# Patient Record
Sex: MALE | Race: WHITE | Employment: OTHER | URBAN - METROPOLITAN AREA
[De-identification: names, ages, dates, MRNs, and addresses within clinical notes are randomized per-mention and may not be internally consistent; named-entity substitution may affect disease eponyms.]

---

## 2017-07-15 PROBLEM — R60.0 BILATERAL LEG EDEMA: Status: ACTIVE | Noted: 2017-07-15

## 2017-07-15 PROBLEM — E78.5 HYPERLIPIDEMIA: Status: ACTIVE | Noted: 2017-07-15

## 2017-07-15 PROBLEM — I25.119 CORONARY ARTERY DISEASE WITH ANGINA PECTORIS (HCC): Status: ACTIVE | Noted: 2017-07-15

## 2017-07-15 PROBLEM — I27.20 PULMONARY HTN (HCC): Status: ACTIVE | Noted: 2017-07-15

## 2018-03-06 PROBLEM — I25.10 CORONARY ARTERY DISEASE INVOLVING NATIVE CORONARY ARTERY OF NATIVE HEART WITHOUT ANGINA PECTORIS: Status: ACTIVE | Noted: 2018-03-06

## 2019-10-24 PROBLEM — I82.409 DEEP VEIN THROMBOSIS (HCC): Status: ACTIVE | Noted: 2017-01-12

## 2019-10-24 PROBLEM — I20.0 PROGRESSIVE ANGINA (HCC): Status: ACTIVE | Noted: 2017-01-12

## 2019-10-24 PROBLEM — M10.9 GOUT: Status: ACTIVE | Noted: 2017-01-12

## 2021-11-03 ENCOUNTER — APPOINTMENT (OUTPATIENT)
Dept: GENERAL RADIOLOGY | Facility: CLINIC | Age: 66
End: 2021-11-03
Payer: MEDICARE

## 2021-11-03 ENCOUNTER — HOSPITAL ENCOUNTER (EMERGENCY)
Facility: CLINIC | Age: 66
Discharge: HOME | End: 2021-11-03
Attending: EMERGENCY MEDICINE
Payer: MEDICARE

## 2021-11-03 VITALS
RESPIRATION RATE: 22 BRPM | TEMPERATURE: 98.4 F | HEIGHT: 74 IN | DIASTOLIC BLOOD PRESSURE: 85 MMHG | BODY MASS INDEX: 40.43 KG/M2 | WEIGHT: 315 LBS | SYSTOLIC BLOOD PRESSURE: 121 MMHG | OXYGEN SATURATION: 98 % | HEART RATE: 115 BPM

## 2021-11-03 DIAGNOSIS — R05.9 COUGH: Primary | ICD-10-CM

## 2021-11-03 PROCEDURE — 99283 EMERGENCY DEPT VISIT LOW MDM: CPT

## 2021-11-03 PROCEDURE — 71045 X-RAY EXAM CHEST 1 VIEW: CPT

## 2021-11-03 RX ORDER — PREDNISONE 10 MG/1
TABLET ORAL
Qty: 20 TABLET | Refills: 0 | Status: SHIPPED | OUTPATIENT
Start: 2021-11-03 | End: 2021-11-13

## 2021-11-03 RX ORDER — AZITHROMYCIN 250 MG/1
TABLET, FILM COATED ORAL
Qty: 1 PACKET | Refills: 0 | Status: SHIPPED | OUTPATIENT
Start: 2021-11-03 | End: 2021-11-07

## 2021-11-03 NOTE — ED PROVIDER NOTES
Weight: (!) 152.4 kg (336 lb)   Height: 6' 2\" (1.88 m)     -------------------------  BP: 121/85, Temp: 98.4 °F (36.9 °C), Pulse: 115, Resp: 22          FINAL IMPRESSION      1. Cough          DISPOSITION/PLAN   DISPOSITION Decision To Discharge 11/03/2021 07:52:27 PM    I have reviewed the disposition diagnosis with the patient and or their family/guardian. I have answered their questions and given discharge instructions. They voiced understanding of these instructions and did not have any further questions or complaints. Condition on Disposition    fair    PATIENT REFERRED TO:  Warner Solomon   [address] [address] [facility]  [phone]    In 2 days        DISCHARGE MEDICATIONS:  New Prescriptions    AZITHROMYCIN (ZITHROMAX Z-GERMAN) 250 MG TABLET    Take 2 tablets (500 mg) on Day 1, and then take 1 tablet (250 mg) on days 2 through 5.     PREDNISONE (DELTASONE) 10 MG TABLET    Take 4 tablets by mouth once daily for 5 days       (Please note that portions of this note were completed with a voice recognition program.  Efforts were made to edit the dictations but occasionally words are mis-transcribed.)    Tank Kelly MD,, MD  Attending Emergency Physician        Tank Kelly MD  11/03/21 1956

## 2021-11-03 NOTE — ED PROVIDER NOTES
[address] [facility]      Pt Name: Royce Charlton  MRN: [id number]  [place] 1955  Date of evaluation: 11/3/2021      CHIEF COMPLAINT       Chief Complaint   Patient presents with    Cough         HISTORY OF PRESENT ILLNESS     The patient presents with cough for 4 days. He was exposed to family members with cough and cold symptoms. The patient says he has had a cough with clear sputum. He has not had a fever. He has been vaccinated against COVID-19. He denies chest pain. He has not noted any worsening leg swelling than normal.  Nothing makes his symptoms better or worse otherwise. REVIEW OF SYSTEMS       All systems reviewed and negative unless noted in HPI. The patient denies fever or constitutional symptoms. Denies vision change. Mild rhinorrhea. Minimal sore throat. Denies any neck pain or stiffness. Denies chest pain. Productive cough of clear sputum with mild shortness of breath. Denies hemoptysis. No nausea,  vomiting or diarrhea. Denies any dysuria. Denies urinary frequency or hematuria. Denies musculoskeletal injury or pain. Denies any weakness, numbness or focal neurologic deficit. Chronic edema in legs. No recent psychiatric issues. Takes warfarin. Denies any polyuria, polydypsia or history of immunocompromise. PAST MEDICAL HISTORY    has a past medical history of Arthritis, CAD (coronary artery disease), Chronic kidney disease, Gastric ulcer, Gout, History of pulmonary embolus (PE), Hypercoagulable state ([address] Hyperlipidemia, Hypertension, Proteinuria, and Pulmonary emboli ([address] SURGICAL HISTORY      has a past surgical history that includes Vasectomy; Coronary angioplasty with stent (5/14); Vena Cava Filter Placement; vasectomy reversal; and Tonsillectomy.     CURRENT MEDICATIONS       Previous Medications    ASCORBIC ACID (VITAMIN C) 500 MG TABLET    Take 1,000 mg by mouth daily     ASPIRIN 81 MG TABLET    Take 81 mg by mouth daily    ATORVASTATIN (LIPITOR) 40 MG TABLET    TAKE ONE TABLET BY MOUTH DAILY    ATORVASTATIN (LIPITOR) 40 MG TABLET    Take 1 tablet by mouth daily    CALCIUM CARBONATE (OSCAL) 500 MG TABS TABLET    Take 1,000 mg by mouth daily    CANDESARTAN (ATACAND) 4 MG TABLET    TAKE ONE TABLET BY MOUTH DAILY    FUROSEMIDE (LASIX) 20 MG TABLET    Take 1 tablet by mouth daily    MULTIPLE VITAMINS-MINERALS (MULTIVITAMIN WITH MINERALS) TABLET    Take 1 tablet by mouth daily. VITAMIN B-12 (CYANOCOBALAMIN) 1000 MCG TABLET    Take 1,000 mcg by mouth daily. VITAMIN D (CHOLECALCIFEROL) 1000 UNIT TABS TABLET    Take 1,000 Units by mouth daily     WARFARIN (COUMADIN) 1 MG TABLET    Take by mouth Mon,wed,fri 5mg/tues,th,sat,sun-7mg    ZOLPIDEM (AMBIEN) 10 MG TABLET    Take 10 mg by mouth nightly as needed for Sleep. ALLERGIES     has No Known Allergies. FAMILY HISTORY     He indicated that his mother is . He indicated that his father is . family history includes Heart Disease in his mother. SOCIAL HISTORY      reports that he quit smoking about 8 years ago. He has never used smokeless tobacco. He reports current alcohol use. He reports that he does not use drugs. PHYSICAL EXAM     INITIAL VITALS:  height is 6' 2\" (1.88 m) and weight is 152.4 kg (336 lb) (abnormal). His temperature is 98.4 °F (36.9 °C). His blood pressure is 121/85 and his pulse is 115. His respiration is 22 and oxygen saturation is 98%. The patient is alert and oriented, in no apparent distress. HEENT is atraumatic. Pupils are PERRL at 4 mm with normal extraocular motion. Mucous membranes moist.    Neck is supple. Heart sounds regular rate and rhythm with no gallops, murmurs, or rubs. Lungs clear, no wheezes, rales or rhonchi. Abdomen: soft, nontender with no pain to palpation. Musculoskeletal exam shows no evidence of trauma. Normal distal pulses in all extremities.   Skin: Trace to 1+ edema with chronic stasis change in the legs bilaterally. Neurological exam reveals cranial nerves 2 through 12 grossly intact. Patient has equal  and normal deep tendon reflexes. Psychiatric: Appropriate  Lymphatics.:  No lymphadenopathy. DIFFERENTIAL DIAGNOSIS/ MDM:     URI, cough, bronchitis, pneumonia, ACS, CHF    DIAGNOSTIC RESULTS     RADIOLOGY:   I reviewed the radiologist interpretations:  XR CHEST PORTABLE    (Results Pending)           EMERGENCY DEPARTMENT COURSE:   Vitals:    Vitals:    11/03/21 1838   BP: 121/85   Pulse: 115   Resp: 22   Temp: 98.4 °F (36.9 °C)   SpO2: 98%   Weight: (!) 152.4 kg (336 lb)   Height: 6' 2\" (1.88 m)     -------------------------  BP: 121/85, Temp: 98.4 °F (36.9 °C), Pulse: 115, Resp: 22      Re-evaluation Notes    The patient will be endorsed to Dr. Dottie Tai secondary to end of shift. Please refer to his documentation. FINAL IMPRESSION    No diagnosis found. DISPOSITION/PLAN   DISPOSITION        Condition on Disposition    Stable    PATIENT REFERRED TO:  No follow-up provider specified.     DISCHARGE MEDICATIONS:  New Prescriptions    No medications on file       (Please note that portions of this note were completed with a voice recognition program.  Efforts were made to edit the dictations but occasionally words are mis-transcribed.)    Lucero Davis MD,, MD   Attending Emergency Physician       Dai Boucher MD  11/03/21 [id number]